# Patient Record
Sex: FEMALE | Race: WHITE
[De-identification: names, ages, dates, MRNs, and addresses within clinical notes are randomized per-mention and may not be internally consistent; named-entity substitution may affect disease eponyms.]

---

## 2018-01-26 ENCOUNTER — HOSPITAL ENCOUNTER (INPATIENT)
Dept: HOSPITAL 25 - MCHOBOUT | Age: 28
LOS: 3 days | Discharge: HOME | DRG: 560 | End: 2018-01-29
Attending: OBSTETRICS & GYNECOLOGY | Admitting: OBSTETRICS & GYNECOLOGY
Payer: COMMERCIAL

## 2018-01-26 DIAGNOSIS — Z87.891: ICD-10-CM

## 2018-01-26 DIAGNOSIS — Z3A.40: ICD-10-CM

## 2018-01-26 DIAGNOSIS — F32.9: ICD-10-CM

## 2018-01-26 LAB
BASOPHILS # BLD AUTO: 0.1 10^3/UL (ref 0–0.2)
EOSINOPHIL # BLD AUTO: 0 10^3/UL (ref 0–0.6)
HCT VFR BLD AUTO: 34 % (ref 35–47)
HGB BLD-MCNC: 11.5 G/DL (ref 12–16)
LYMPHOCYTES # BLD AUTO: 2.3 10^3/UL (ref 1–4.8)
MCH RBC QN AUTO: 29 PG (ref 27–31)
MCHC RBC AUTO-ENTMCNC: 34 G/DL (ref 31–36)
MCV RBC AUTO: 85 FL (ref 80–97)
MONOCYTES # BLD AUTO: 0.7 10^3/UL (ref 0–0.8)
NEUTROPHILS # BLD AUTO: 11.3 10^3/UL (ref 1.5–7.7)
NRBC # BLD AUTO: 0 10^3/UL
NRBC BLD QL AUTO: 0
PLATELET # BLD AUTO: 318 10^3/UL (ref 150–450)
RBC # BLD AUTO: 4.01 10^6/UL (ref 4–5.4)
WBC # BLD AUTO: 14.3 10^3/UL (ref 3.5–10.8)

## 2018-01-26 PROCEDURE — 86900 BLOOD TYPING SEROLOGIC ABO: CPT

## 2018-01-26 PROCEDURE — 84112 EVAL AMNIOTIC FLUID PROTEIN: CPT

## 2018-01-26 PROCEDURE — 86850 RBC ANTIBODY SCREEN: CPT

## 2018-01-26 PROCEDURE — 86901 BLOOD TYPING SEROLOGIC RH(D): CPT

## 2018-01-26 PROCEDURE — 85461 HEMOGLOBIN FETAL: CPT

## 2018-01-26 PROCEDURE — 85025 COMPLETE CBC W/AUTO DIFF WBC: CPT

## 2018-01-26 PROCEDURE — 36415 COLL VENOUS BLD VENIPUNCTURE: CPT

## 2018-01-27 PROCEDURE — 10907ZC DRAINAGE OF AMNIOTIC FLUID, THERAPEUTIC FROM PRODUCTS OF CONCEPTION, VIA NATURAL OR ARTIFICIAL OPENING: ICD-10-PCS | Performed by: OBSTETRICS & GYNECOLOGY

## 2018-01-27 RX ADMIN — PENICILLIN G POTASSIUM SCH MLS/HR: 20000000 POWDER, FOR SOLUTION INTRAVENOUS at 07:23

## 2018-01-27 RX ADMIN — PENICILLIN G POTASSIUM SCH MLS/HR: 20000000 POWDER, FOR SOLUTION INTRAVENOUS at 03:41

## 2018-01-27 RX ADMIN — PENICILLIN G POTASSIUM SCH MLS/HR: 20000000 POWDER, FOR SOLUTION INTRAVENOUS at 15:20

## 2018-01-27 RX ADMIN — PENICILLIN G POTASSIUM SCH MLS/HR: 20000000 POWDER, FOR SOLUTION INTRAVENOUS at 11:27

## 2018-01-27 RX ADMIN — DOCUSATE SODIUM SCH MG: 100 CAPSULE, LIQUID FILLED ORAL at 21:59

## 2018-01-27 RX ADMIN — SIMETHICONE CHEW TAB 80 MG SCH: 80 TABLET ORAL at 22:20

## 2018-01-28 LAB
BASOPHILS # BLD AUTO: 0.1 10^3/UL (ref 0–0.2)
EOSINOPHIL # BLD AUTO: 0.1 10^3/UL (ref 0–0.6)
HCT VFR BLD AUTO: 31 % (ref 35–47)
HGB BLD-MCNC: 10.7 G/DL (ref 12–16)
LYMPHOCYTES # BLD AUTO: 3 10^3/UL (ref 1–4.8)
MCH RBC QN AUTO: 29 PG (ref 27–31)
MCHC RBC AUTO-ENTMCNC: 34 G/DL (ref 31–36)
MCV RBC AUTO: 86 FL (ref 80–97)
MONOCYTES # BLD AUTO: 0.7 10^3/UL (ref 0–0.8)
NEUTROPHILS # BLD AUTO: 8 10^3/UL (ref 1.5–7.7)
NRBC # BLD AUTO: 0 10^3/UL
NRBC BLD QL AUTO: 0
PLATELET # BLD AUTO: 266 10^3/UL (ref 150–450)
RBC # BLD AUTO: 3.68 10^6/UL (ref 4–5.4)
WBC # BLD AUTO: 12 10^3/UL (ref 3.5–10.8)

## 2018-01-28 RX ADMIN — ACETAMINOPHEN PRN MG: 325 TABLET ORAL at 00:03

## 2018-01-28 RX ADMIN — IBUPROFEN PRN MG: 600 TABLET, FILM COATED ORAL at 09:34

## 2018-01-28 RX ADMIN — DOCUSATE SODIUM SCH: 100 CAPSULE, LIQUID FILLED ORAL at 15:58

## 2018-01-28 RX ADMIN — IBUPROFEN PRN MG: 600 TABLET, FILM COATED ORAL at 00:03

## 2018-01-28 RX ADMIN — DOCUSATE SODIUM SCH: 100 CAPSULE, LIQUID FILLED ORAL at 21:00

## 2018-01-28 RX ADMIN — ACETAMINOPHEN PRN MG: 325 TABLET ORAL at 19:50

## 2018-01-28 RX ADMIN — DOCUSATE SODIUM SCH MG: 100 CAPSULE, LIQUID FILLED ORAL at 09:34

## 2018-01-28 RX ADMIN — IBUPROFEN PRN MG: 600 TABLET, FILM COATED ORAL at 22:27

## 2018-01-28 RX ADMIN — IBUPROFEN PRN MG: 600 TABLET, FILM COATED ORAL at 16:40

## 2018-01-29 VITALS — SYSTOLIC BLOOD PRESSURE: 107 MMHG | DIASTOLIC BLOOD PRESSURE: 61 MMHG

## 2018-01-29 RX ADMIN — IBUPROFEN PRN MG: 600 TABLET, FILM COATED ORAL at 04:20

## 2018-09-28 ENCOUNTER — HOSPITAL ENCOUNTER (OUTPATIENT)
Dept: HOSPITAL 25 - ED | Age: 28
Setting detail: OBSERVATION
LOS: 3 days | Discharge: HOME | End: 2018-10-01
Attending: SURGERY | Admitting: SURGERY
Payer: COMMERCIAL

## 2018-09-28 DIAGNOSIS — E80.6: ICD-10-CM

## 2018-09-28 DIAGNOSIS — R11.2: ICD-10-CM

## 2018-09-28 DIAGNOSIS — Z79.899: ICD-10-CM

## 2018-09-28 DIAGNOSIS — F17.210: ICD-10-CM

## 2018-09-28 DIAGNOSIS — Z23: ICD-10-CM

## 2018-09-28 DIAGNOSIS — Z88.8: ICD-10-CM

## 2018-09-28 DIAGNOSIS — K80.10: Primary | ICD-10-CM

## 2018-09-28 DIAGNOSIS — K81.9: ICD-10-CM

## 2018-09-28 DIAGNOSIS — R74.8: ICD-10-CM

## 2018-09-28 LAB
BASOPHILS # BLD AUTO: 0.1 10^3/UL (ref 0–0.2)
EOSINOPHIL # BLD AUTO: 0.1 10^3/UL (ref 0–0.6)
HCT VFR BLD AUTO: 41 % (ref 35–47)
HGB BLD-MCNC: 13.7 G/DL (ref 12–16)
LYMPHOCYTES # BLD AUTO: 2.1 10^3/UL (ref 1–4.8)
MCH RBC QN AUTO: 28 PG (ref 27–31)
MCHC RBC AUTO-ENTMCNC: 34 G/DL (ref 31–36)
MCV RBC AUTO: 83 FL (ref 80–97)
MONOCYTES # BLD AUTO: 0.6 10^3/UL (ref 0–0.8)
NEUTROPHILS # BLD AUTO: 4.8 10^3/UL (ref 1.5–7.7)
NRBC # BLD AUTO: 0 10^3/UL
NRBC BLD QL AUTO: 0.1
PLATELET # BLD AUTO: 335 10^3/UL (ref 150–450)
RBC # BLD AUTO: 4.9 10^6/UL (ref 4–5.4)
RBC UR QL AUTO: (no result)
WBC # BLD AUTO: 7.7 10^3/UL (ref 3.5–10.8)
WBC UR QL AUTO: (no result)

## 2018-09-28 PROCEDURE — 87086 URINE CULTURE/COLONY COUNT: CPT

## 2018-09-28 PROCEDURE — 99406 BEHAV CHNG SMOKING 3-10 MIN: CPT

## 2018-09-28 PROCEDURE — 99283 EMERGENCY DEPT VISIT LOW MDM: CPT

## 2018-09-28 PROCEDURE — G0378 HOSPITAL OBSERVATION PER HR: HCPCS

## 2018-09-28 PROCEDURE — 96376 TX/PRO/DX INJ SAME DRUG ADON: CPT

## 2018-09-28 PROCEDURE — 84702 CHORIONIC GONADOTROPIN TEST: CPT

## 2018-09-28 PROCEDURE — G0008 ADMIN INFLUENZA VIRUS VAC: HCPCS

## 2018-09-28 PROCEDURE — 85025 COMPLETE CBC W/AUTO DIFF WBC: CPT

## 2018-09-28 PROCEDURE — 86140 C-REACTIVE PROTEIN: CPT

## 2018-09-28 PROCEDURE — 83690 ASSAY OF LIPASE: CPT

## 2018-09-28 PROCEDURE — 80053 COMPREHEN METABOLIC PANEL: CPT

## 2018-09-28 PROCEDURE — 90686 IIV4 VACC NO PRSV 0.5 ML IM: CPT

## 2018-09-28 PROCEDURE — 90471 IMMUNIZATION ADMIN: CPT

## 2018-09-28 PROCEDURE — 36415 COLL VENOUS BLD VENIPUNCTURE: CPT

## 2018-09-28 PROCEDURE — 80076 HEPATIC FUNCTION PANEL: CPT

## 2018-09-28 PROCEDURE — 76705 ECHO EXAM OF ABDOMEN: CPT

## 2018-09-28 PROCEDURE — 96374 THER/PROPH/DIAG INJ IV PUSH: CPT

## 2018-09-28 PROCEDURE — 96375 TX/PRO/DX INJ NEW DRUG ADDON: CPT

## 2018-09-28 PROCEDURE — 88304 TISSUE EXAM BY PATHOLOGIST: CPT

## 2018-09-28 PROCEDURE — 81003 URINALYSIS AUTO W/O SCOPE: CPT

## 2018-09-28 PROCEDURE — 83605 ASSAY OF LACTIC ACID: CPT

## 2018-09-28 PROCEDURE — 81015 MICROSCOPIC EXAM OF URINE: CPT

## 2018-09-28 NOTE — ED
Abdominal Pain/Female





- HPI Summary


HPI Summary: 


Patient is a 29 y/o F w/ c/o RUQ pain onsetting last night at 2200. She reports 

nausea and vomiting onsetting last night as well. In room, nausea is reported 

to still be present. Pain was noted to be intermittent last night but has been 

constant today. Pain radiates to her back. On triage, pain is rated 3/10 and 

nothing is noted to aggravate/alleviate Sx. In room patient notes eating 

aggravates Sx. Home medications and allergies are reviewed. 








- History of Current Complaint


Chief Complaint: EDAbdPain


Stated Complaint: RT SIDE FLANK PAIN,NAUSEA


Time Seen by Provider: 09/28/18 22:04


Hx Obtained From: Patient


Hx Last Menstrual Period: finished last week


Onset/Duration: Lasting Days - onset last night 2200, Still Present


Timing: Constant - today pain has been constant


Severity Currently: Mild - 3/10


Pain Intensity: 3


Pain Scale Used: 0-10 Numeric - 3/10


Location: Discrete At: RUQ


Radiates: Yes


Radiates to: Back


Aggravating Factor(s): Food


Alleviating Factor(s): Nothing


Associated Signs and Symptoms: Positive: Back Pain, Nausea, Vomiting


Allergies/Adverse Reactions: 


 Allergies











Allergy/AdvReac Type Severity Reaction Status Date / Time


 


shellfish derived Allergy  Anaphylatic Verified 09/28/18 19:15





   Shock  


 


Sulfa (Sulfonamide Allergy  Vomiting Verified 09/28/18 19:15





Antibiotics)     


 


trazodone Allergy  Shortness Verified 09/28/18 19:15





   of Breath  














PMH/Surg Hx/FS Hx/Imm Hx


Sensory History: 


   Denies: Hx Legally Blind, Hx Deafness


Opthamlomology History: 


   Denies: Hx Legally Blind


EENT History: 


   Denies: Hx Deafness


Psychiatric History: Reports: Hx Depression - taking zoloft


Infectious Disease History: No


Infectious Disease History: 


   Denies: Traveled Outside the [place] in Last 30 Days





- Family History


Known Family History: 


   Negative: Blood Disorder





- Social History


Alcohol Use: None


Substance Use Type: Reports: None


Smoking Status (MU): Never Smoked Tobacco





Review of Systems


Negative: Fever - on vitals, temp is 97.4 F 


Positive: Abdominal Pain - RUQ , Vomiting, Nausea


Positive: Other - RUQ pain radiates to back 


All Other Systems Reviewed And Are Negative: Yes





Physical Exam





- Summary


Physical Exam Summary: 


 


VITAL SIGNS: Reviewed.


GENERAL:  Patient is a well-developed and nourished female who is lying 

comfortable in the stretcher. Patient is not in any acute respiratory distress.


HEAD AND FACE: No signs of trauma. No ecchymosis, hematomas or skull 

depressions. No sinus tenderness.


EYES: PERRLA, EOMI x 2, No injected conjunctiva, no nystagmus.


EARS: Hearing grossly intact. Ear canals and tympanic membranes are within 

normal limits.


MOUTH: Oropharynx within normal limits.


NECK: Supple, trachea is midline, no adenopathy, no JVD, no carotid bruit, no c-

spine tenderness, neck with full ROM.


CHEST: Symmetric, no tenderness at palpation


LUNGS: Clear to auscultation bilaterally. No wheezing or crackles.


CVS: Regular rate and rhythm, S1 and S2 present, no murmurs or gallops 

appreciated.


ABDOMEN: Soft, RUQ tenderness. No signs of distention. No rebound no guarding, 

and no masses palpated. Bowel sounds are normal.


EXTREMITIES: FROM in all major joints, no edema, no cyanosis or clubbing.


NEURO: Alert and oriented x 3. No acute neurological deficits. Speech is normal 

and follows commands.


SKIN: Dry and warm








Triage Information Reviewed: Yes


Vital Signs On Initial Exam: 


 Initial Vitals











Temp Pulse Resp BP Pulse Ox


 


 97.4 F   87   16   129/65   97 


 


 09/28/18 19:12  09/28/18 19:12  09/28/18 19:12  09/28/18 19:12  09/28/18 19:12











Vital Signs Reviewed: Yes





Diagnostics





- Vital Signs


 Vital Signs











  Temp Pulse Resp BP Pulse Ox


 


 09/28/18 21:14  97.4 F  63  16  112/42  100


 


 09/28/18 19:12  97.4 F  87  16  129/65  97














- Laboratory


Lab Results: 


 Lab Results











  09/28/18 09/28/18 09/28/18 Range/Units





  20:38 20:38 20:38 


 


WBC  7.7    (3.5-10.8)  10^3/ul


 


RBC  4.90    (4.00-5.40)  10^6/ul


 


Hgb  13.7    (12.0-16.0)  g/dl


 


Hct  41    (35-47)  %


 


MCV  83    (80-97)  fL


 


MCH  28    (27-31)  pg


 


MCHC  34    (31-36)  g/dl


 


RDW  14    (10.5-15)  %


 


Plt Count  335    (150-450)  10^3/ul


 


MPV  7.8    (7.4-10.4)  um3


 


Neut % (Auto)  62.4    (38-83)  %


 


Lymph % (Auto)  27.2    (25-47)  %


 


Mono % (Auto)  8.0 H    (0-7)  %


 


Eos % (Auto)  1.6    (0-6)  %


 


Baso % (Auto)  0.8    (0-2)  %


 


Absolute Neuts (auto)  4.8    (1.5-7.7)  10^3/ul


 


Absolute Lymphs (auto)  2.1    (1.0-4.8)  10^3/ul


 


Absolute Monos (auto)  0.6    (0-0.8)  10^3/ul


 


Absolute Eos (auto)  0.1    (0-0.6)  10^3/ul


 


Absolute Basos (auto)  0.1    (0-0.2)  10^3/ul


 


Absolute Nucleated RBC  0    10^3/ul


 


Nucleated RBC %  0.1    


 


Sodium   139   (135-145)  mmol/L


 


Potassium   4.2   (3.5-5.0)  mmol/L


 


Chloride   105   (101-111)  mmol/L


 


Carbon Dioxide   27   (22-32)  mmol/L


 


Anion Gap   7   (2-11)  mmol/L


 


BUN   12   (6-24)  mg/dL


 


Creatinine   0.71   (0.51-0.95)  mg/dL


 


Est GFR ( Amer)   117.0   (>60)  


 


Est GFR (Non-Af Amer)   96.7   (>60)  


 


BUN/Creatinine Ratio   16.9   (8-20)  


 


Glucose   100   ()  mg/dL


 


Lactic Acid    0.8  (0.5-2.0)  mmol/L


 


Calcium   9.1   (8.6-10.3)  mg/dL


 


Total Bilirubin   1.20 H   (0.2-1.0)  mg/dL


 


AST   457 H   (13-39)  U/L


 


ALT   475 H   (7-52)  U/L


 


Alkaline Phosphatase   172 H   ()  U/L


 


C-Reactive Protein   11.34 H   (<8.01)  mg/L


 


Total Protein   7.0   (6.4-8.9)  g/dL


 


Albumin   4.2   (3.2-5.2)  g/dL


 


Globulin   2.8   (2-4)  g/dL


 


Albumin/Globulin Ratio   1.5   (1-3)  


 


Lipase   29   (11.0-82.0)  U/L


 


Beta HCG, Quant   < 0.60   mIU/mL











Result Diagrams: 


 09/28/18 20:38





 09/28/18 20:38


Lab Statement: Any lab studies that have been ordered have been reviewed, and 

results considered in the medical decision making process.





- Ultrasound


  ** No standard instances


Ultrasound Interpretation: Positive (See Comments)


Ultrasound Interpretation Completed By: Radiologist - US gallbladder impressions

: cholelithiasis; this report was reviewed by ED physician





Re-Evaluation





- Re-Evaluation


  ** First Eval


Re-Evaluation Time: 00:55


Comment: Discuss[place] [facility] [place] Bae see her in 

the morning. Patient understands and is agreeable with this plan.





Abdominal Pain Fem Course/Dx





- Course


Course Of Treatment: Patient is a 29 y/o F w/ c/o RUQ pain onsetting last night 

at 2200. She reports nausea and vomiting onsetting last night as well. In room, 

nausea is reported to still be present. Pain was noted to be intermittent last 

night but has been constant today. Pain radiates to her back. On triage, pain 

is rated 3/10 and nothing is noted to aggravate/alleviate Sx. In room patient 

notes eating aggravates Sx. Physical exam showed RUQ tenderness. During ED 

course, patient was given fluids. UA showed 2+ urine blood, positive 

urobilinogen, urine leukocyte esterase, urine WBC 2+(11-20/hpf), urine RBC 2+(6-

10/hpf), and urine squamous epith cells present. Urine color was kay and 

cloudy in appearance. Bloodwork showed CRP 11.34, alkaline phosphatase 172, AST 

457, , and total bilirubin 1.2. Lactic acid was .8. Gallbladder US 

showed cholelithiasis. Dr. Bae was consulted on patient's case at 0049. Dr. Bae accepts patient for [place] [facility] [place] patient in the 

morning. Plan of care was discussed with patient, she understands and is 

agreeable with this plan. Dx of elevated liver enzymes and cholelithiasis.





- Diagnoses


Provider Diagnoses: 


 Cholelithiasis, Elevated liver enzymes








- Provider Notifications


Discussed Care Of Patient With: Gloria Bae


Time Discussed With Above Provider: 00:49


Instructed by Provider To: Other - Patient's case was discussed with Dr. Bae 

at 0049. Dr. Bae accepts patient for [place] [facility] [place] see the 

patient in the morning.





Discharge





- Sign-Out/Discharge


Documenting (check all that apply): Patient Departure - admit 





- Discharge Plan


Condition: Good


Disposition: ADMITTED TO [facility]


Referrals: 


Bairon Au MD [Primary Care Provider] - 





- Attestation Statements


Document Initiated by Scribe: Yes


Documenting Scribe: Tej Palmer


Provider For Whom Scribe is Documenting (Include Credential): Armando Costa MD


Scribe Attestation: 


ITej, scribed for Armando Costa MD on 09/29/18 at 0135.

## 2018-09-29 LAB
BASOPHILS # BLD AUTO: 0 10^3/UL (ref 0–0.2)
EOSINOPHIL # BLD AUTO: 0.2 10^3/UL (ref 0–0.6)
HCT VFR BLD AUTO: 39 % (ref 35–47)
HGB BLD-MCNC: 13.4 G/DL (ref 12–16)
LYMPHOCYTES # BLD AUTO: 1.6 10^3/UL (ref 1–4.8)
MCH RBC QN AUTO: 28 PG (ref 27–31)
MCHC RBC AUTO-ENTMCNC: 34 G/DL (ref 31–36)
MCV RBC AUTO: 83 FL (ref 80–97)
MONOCYTES # BLD AUTO: 0.4 10^3/UL (ref 0–0.8)
NEUTROPHILS # BLD AUTO: 4.7 10^3/UL (ref 1.5–7.7)
NRBC # BLD AUTO: 0 10^3/UL
NRBC BLD QL AUTO: 0
PLATELET # BLD AUTO: 289 10^3/UL (ref 150–450)
RBC # BLD AUTO: 4.76 10^6/UL (ref 4–5.4)
WBC # BLD AUTO: 7 10^3/UL (ref 3.5–10.8)

## 2018-09-29 PROCEDURE — 0FT44ZZ RESECTION OF GALLBLADDER, PERCUTANEOUS ENDOSCOPIC APPROACH: ICD-10-PCS | Performed by: SURGERY

## 2018-09-29 PROCEDURE — BF10YZZ FLUOROSCOPY OF BILE DUCTS USING OTHER CONTRAST: ICD-10-PCS | Performed by: SURGERY

## 2018-09-29 RX ADMIN — DEXTROSE MONOHYDRATE, SODIUM CHLORIDE, AND POTASSIUM CHLORIDE SCH MLS/HR: 50; 4.5; 1.49 INJECTION, SOLUTION INTRAVENOUS at 21:06

## 2018-09-29 RX ADMIN — DEXTROSE MONOHYDRATE, SODIUM CHLORIDE, AND POTASSIUM CHLORIDE SCH MLS/HR: 50; 4.5; 1.49 INJECTION, SOLUTION INTRAVENOUS at 03:27

## 2018-09-29 RX ADMIN — PIPERACILLIN AND TAZOBACTAM SCH MLS/HR: 3; .375 INJECTION, POWDER, LYOPHILIZED, FOR SOLUTION INTRAVENOUS; PARENTERAL at 16:44

## 2018-09-29 RX ADMIN — ACETAMINOPHEN PRN MG: 325 TABLET ORAL at 22:38

## 2018-09-29 RX ADMIN — MORPHINE SULFATE PRN MG: 4 INJECTION, SOLUTION INTRAMUSCULAR; INTRAVENOUS at 19:46

## 2018-09-29 RX ADMIN — DEXTROSE MONOHYDRATE, SODIUM CHLORIDE, AND POTASSIUM CHLORIDE SCH MLS/HR: 50; 4.5; 1.49 INJECTION, SOLUTION INTRAVENOUS at 12:32

## 2018-09-29 RX ADMIN — PIPERACILLIN AND TAZOBACTAM SCH MLS/HR: 3; .375 INJECTION, POWDER, LYOPHILIZED, FOR SOLUTION INTRAVENOUS; PARENTERAL at 08:25

## 2018-09-29 NOTE — RAD
EXAM:

  US Abdomen Limited, Right Upper Quadrant



CLINICAL HISTORY:

  28 years old, female; Pain; Abdominal pain; Tenderness; Right upper quadrant 

(ruq)



TECHNIQUE:

  Real-time ultrasound of the right upper quadrant with image documentation.



COMPARISON:

  No relevant prior studies available.



FINDINGS:

  Liver:  Unremarkable.  No mass.  No intrahepatic bile duct dilation.

  Gallbladder:  Multiple gallstones including a stone impacted in the 

gallbladder neck.  Gallbladder wall measures 2.3 mm.  Technologist reports 

patient pain when imaged over the gallbladder.

  Common bile duct:  Common bile duct measures 5.2 mm.  No stones.  No dilation.

  Pancreas:  Pancreas poorly visualized due to overlying bowel gas.

  Right kidney:  Right kidney measures 11.3 x 6.3 x 5.2 cm.  No stones.  No 

hydronephrosis.



IMPRESSION:     

  1. Cholelithiasis.

## 2018-09-29 NOTE — PN
Progress Note





- Progress Note


Date of Service: 09/29/18


Note: 





Surgery H & P





Asked by Dr. Yeung to admit a pt. with cholelithiasis.  Ms. Melton is a 28 

y.o. female who reports that this episode began suddenly 2 nights ago.  She 

woke up out of sleep with intense stabbing epigastric and RUQ pain.  She 

vomited but didn't feel better.  She "couldn't get comfortable."  She had had 

tater tots and chicken tenders for dinner preceding the episode.  The pain 

subsided somewhat, but recurred last night such that she had to come to the ER.

  She recalls that ever since the pregnancy with her older child 4 years ago, 

she has had 2-3 episodes per year of similar but less intense pain.  She denies 

change in the color of her urine or stool with any of the episodes.  In the ER 

she was found by [place] to have multiple gallstones including one that appears to 

be impacted in the duct.





PMHx:  denies


ROS:  neg.


Meds:  mirena IUD


ALL:  sulfa, trazodone


SH:  1/2 ppd cigs x ~10 years; 


FH:  mother has h/o DM, CVA; twin sister and a cousin have h/o cholecystectomy





PE: General:  overweight female in NAD


 Vital Signs - 24 hr











  09/28/18 09/28/18 09/28/18





  19:12 21:14 22:11


 


Temperature 97.4 F 97.4 F 


 


Pulse Rate 87 63 68


 


Respiratory 16 16 





Rate   


 


Blood Pressure 129/65 112/42 115/80





(mmHg)   


 


O2 Sat by Pulse 97 100 100





Oximetry   














  09/28/18 09/28/18 09/28/18





  22:12 22:40 23:00


 


Temperature   


 


Pulse Rate 72 72 67


 


Respiratory   





Rate   


 


Blood Pressure  121/72 





(mmHg)   


 


O2 Sat by Pulse 100 99 98





Oximetry   














  09/28/18 09/29/18 09/29/18





  23:10 00:06 00:07


 


Temperature   


 


Pulse Rate 70 64 66


 


Respiratory   





Rate   


 


Blood Pressure 126/73  106/62





(mmHg)   


 


O2 Sat by Pulse 100 100 99





Oximetry   














  09/29/18 09/29/18 09/29/18





  00:11 01:22 03:12


 


Temperature  98.1 F 98.0 F


 


Pulse Rate 64 78 67


 


Respiratory  16 16





Rate   


 


Blood Pressure 103/58 129/89 113/53





(mmHg)   


 


O2 Sat by Pulse 98 99 100





Oximetry   














  09/29/18 09/29/18 09/29/18





  03:45 04:25 07:17


 


Temperature   97.7 F


 


Pulse Rate  62 72


 


Respiratory 16  18





Rate   


 


Blood Pressure   118/59





(mmHg)   


 


O2 Sat by Pulse   99





Oximetry   














  09/29/18





  07:23


 


Temperature 


 


Pulse Rate 


 


Respiratory 18





Rate 


 


Blood Pressure 





(mmHg) 


 


O2 Sat by Pulse 





Oximetry 











   HEENT:  anicteric sclerae, moist oral mucosa, neg. cervical adenopathy


   lungs:  clear to ausc.


   heart:  reg.


   abd:  obese, good BS, soft, non-tender, no guarding or rebound


   ext:  neg. cyanosis, edema


   


 Laboratory Results - last 24 hr











  09/28/18 09/28/18 09/28/18





  20:38 20:38 20:38


 


WBC  7.7  


 


RBC  4.90  


 


Hgb  13.7  


 


Hct  41  


 


MCV  83  


 


MCH  28  


 


MCHC  34  


 


RDW  14  


 


Plt Count  335  


 


MPV  7.8  


 


Neut % (Auto)  62.4  


 


Lymph % (Auto)  27.2  


 


Mono % (Auto)  8.0 H  


 


Eos % (Auto)  1.6  


 


Baso % (Auto)  0.8  


 


Absolute Neuts (auto)  4.8  


 


Absolute Lymphs (auto)  2.1  


 


Absolute Monos (auto)  0.6  


 


Absolute Eos (auto)  0.1  


 


Absolute Basos (auto)  0.1  


 


Absolute Nucleated RBC  0  


 


Nucleated RBC %  0.1  


 


Sodium   139 


 


Potassium   4.2 


 


Chloride   105 


 


Carbon Dioxide   27 


 


Anion Gap   7 


 


BUN   12 


 


Creatinine   0.71 


 


Est GFR ( Amer)   117.0 


 


Est GFR (Non-Af Amer)   96.7 


 


BUN/Creatinine Ratio   16.9 


 


Glucose   100 


 


Lactic Acid    0.8


 


Calcium   9.1 


 


Total Bilirubin   1.20 H 


 


AST   457 H 


 


ALT   475 H 


 


Alkaline Phosphatase   172 H 


 


C-Reactive Protein   11.34 H 


 


Total Protein   7.0 


 


Albumin   4.2 


 


Globulin   2.8 


 


Albumin/Globulin Ratio   1.5 


 


Lipase   29 


 


Beta HCG, Quant   < 0.60 


 


Urine Color   


 


Urine Appearance   


 


Urine pH   


 


Ur Specific Gravity   


 


Urine Protein   


 


Urine Ketones   


 


Urine Blood   


 


Urine Nitrate   


 


Urine Bilirubin   


 


Urine Urobilinogen   


 


Ur Leukocyte Esterase   


 


Urine WBC (Auto)   


 


Urine RBC (Auto)   


 


Ur Squamous Epith Cells   


 


Urine Bacteria   


 


Urine Glucose   














  09/28/18





  22:10


 


WBC 


 


RBC 


 


Hgb 


 


Hct 


 


MCV 


 


MCH 


 


MCHC 


 


RDW 


 


Plt Count 


 


MPV 


 


Neut % (Auto) 


 


Lymph % (Auto) 


 


Mono % (Auto) 


 


Eos % (Auto) 


 


Baso % (Auto) 


 


Absolute Neuts (auto) 


 


Absolute Lymphs (auto) 


 


Absolute Monos (auto) 


 


Absolute Eos (auto) 


 


Absolute Basos (auto) 


 


Absolute Nucleated RBC 


 


Nucleated RBC % 


 


Sodium 


 


Potassium 


 


Chloride 


 


Carbon Dioxide 


 


Anion Gap 


 


BUN 


 


Creatinine 


 


Est GFR ( Amer) 


 


Est GFR (Non-Af Amer) 


 


BUN/Creatinine Ratio 


 


Glucose 


 


Lactic Acid 


 


Calcium 


 


Total Bilirubin 


 


AST 


 


ALT 


 


Alkaline Phosphatase 


 


C-Reactive Protein 


 


Total Protein 


 


Albumin 


 


Globulin 


 


Albumin/Globulin Ratio 


 


Lipase 


 


Beta HCG, Quant 


 


Urine Color  Becky


 


Urine Appearance  Cloudy


 


Urine pH  6.0


 


Ur Specific Gravity  1.025


 


Urine Protein  Negative


 


Urine Ketones  Negative


 


Urine Blood  2+ A


 


Urine Nitrate  Negative


 


Urine Bilirubin  Negative


 


Urine Urobilinogen  Positive A


 


Ur Leukocyte Esterase  1+ A


 


Urine WBC (Auto)  2+(11-20/hpf) A


 


Urine RBC (Auto)  2+(6-10/hpf) A


 


Ur Squamous Epith Cells  Present A


 


Urine Bacteria  Absent


 


Urine Glucose  Negative











A/P:  Cholelithiasis, with mild hyperbilirubinemia and elevations of liver 

enzymes.  Will repeat labs, consider additional imaging pnd labs.





CLFoster

## 2018-09-30 RX ADMIN — DEXTROSE MONOHYDRATE, SODIUM CHLORIDE, AND POTASSIUM CHLORIDE SCH MLS/HR: 50; 4.5; 1.49 INJECTION, SOLUTION INTRAVENOUS at 23:26

## 2018-09-30 RX ADMIN — ACETAMINOPHEN PRN MG: 325 TABLET ORAL at 19:52

## 2018-09-30 RX ADMIN — PIPERACILLIN AND TAZOBACTAM SCH MLS/HR: 3; .375 INJECTION, POWDER, LYOPHILIZED, FOR SOLUTION INTRAVENOUS; PARENTERAL at 00:43

## 2018-09-30 RX ADMIN — DEXTROSE MONOHYDRATE, SODIUM CHLORIDE, AND POTASSIUM CHLORIDE SCH MLS/HR: 50; 4.5; 1.49 INJECTION, SOLUTION INTRAVENOUS at 13:18

## 2018-09-30 RX ADMIN — MORPHINE SULFATE PRN MG: 4 INJECTION, SOLUTION INTRAMUSCULAR; INTRAVENOUS at 14:43

## 2018-09-30 RX ADMIN — PIPERACILLIN AND TAZOBACTAM SCH MLS/HR: 3; .375 INJECTION, POWDER, LYOPHILIZED, FOR SOLUTION INTRAVENOUS; PARENTERAL at 08:05

## 2018-09-30 RX ADMIN — PIPERACILLIN AND TAZOBACTAM SCH MLS/HR: 3; .375 INJECTION, POWDER, LYOPHILIZED, FOR SOLUTION INTRAVENOUS; PARENTERAL at 16:29

## 2018-09-30 RX ADMIN — DEXTROSE MONOHYDRATE, SODIUM CHLORIDE, AND POTASSIUM CHLORIDE SCH MLS/HR: 50; 4.5; 1.49 INJECTION, SOLUTION INTRAVENOUS at 05:17

## 2018-09-30 NOTE — PN
Progress Note





- Progress Note


Date of Service: 09/30/18


SOAP: 


Subjective:


Pain last night.  None today.  No N/V.





Objective:





 Vital Signs











Temp  97.4 F   09/30/18 11:28


 


Pulse  51   09/30/18 11:28


 


Resp  16   09/30/18 11:28


 


BP  94/60   09/30/18 11:28


 


Pulse Ox  100   09/30/18 11:28





Gen: NAD; anicteric


Abd: soft; ND; tender RUQ; no masses


 Intake & Output











 09/29/18 09/30/18 09/30/18





 18:59 06:59 18:59


 


Intake Total 1339 3027 700


 


Output Total 1150 1300 250


 


Balance 189 1727 450


 


Intake:   


 


  IV Fluids 907 1986 


 


    D5 1/2 NS 20KCL 907 1986 


 


  Medicated  221 


 


    Zosyn 192 221 


 


  Oral 240 820 700


 


Output:   


 


  Urine 1150 1300 250


 


Other:   


 


  Estimated Void Medium  


 


  # Bowel Movements  0 


 


  # Voids 2  








 Laboratory Results - last 24 hr











  09/30/18





  05:26


 


Total Bilirubin  0.50


 


Direct Bilirubin  0.10


 


Indirect Bilirubin  0.4


 


AST  65 H


 


ALT  252 H


 


Alkaline Phosphatase  141 H


 


Total Protein  5.7 L


 


Albumin  3.4


 


Globulin  2.3


 


Albumin/Globulin Ratio  1.5














Assessment:


Sx gallstones.  She may have passed a CBD stone as LFTs are normalizing.








Plan:


lap levon in am.

## 2018-10-01 VITALS — SYSTOLIC BLOOD PRESSURE: 129 MMHG | DIASTOLIC BLOOD PRESSURE: 66 MMHG

## 2018-10-01 RX ADMIN — PIPERACILLIN AND TAZOBACTAM SCH: 3; .375 INJECTION, POWDER, LYOPHILIZED, FOR SOLUTION INTRAVENOUS; PARENTERAL at 10:38

## 2018-10-01 RX ADMIN — PIPERACILLIN AND TAZOBACTAM SCH MLS/HR: 3; .375 INJECTION, POWDER, LYOPHILIZED, FOR SOLUTION INTRAVENOUS; PARENTERAL at 00:36

## 2018-10-02 NOTE — OP
CC:  Dr. Au *

 

DATE OF OPERATION:  10/01/18 - ROOM #3540

 

DATE OF BIRTH:  [birth year]

 

SURGEON:  Juan Luis Rodas MD

 

ASSISTANT:  Caroline Nguyen NP

 

ANESTHESIOLOGIST:  Dr. Lance.

 

ANESTHESIA:  General with local.

 

PRE-OP DIAGNOSES:  Right upper quadrant abdominal pain and gallstones.

 

POST-OP DIAGNOSES:

1.  Acute calculous cholecystitis.

2.  Normal cholangiogram.

 

OPERATIVE PROCEDURE:  Laparoscopic cholecystectomy with intraoperative 
cholangiogram.

 

INDICATIONS:  Ms. Sadie Melton is a 28-year-old woman who presented to the 
emergency room several nights ago with epigastric right upper quadrant 
abdominal discomfort.  She has had similar episodes in the past, which resolved 
quickly but this persisted throughout the course of the day.  She was noted to 
have some mild elevation in total bilirubin and liver transaminases and an 
ultrasound, which showed gallstones with probable gallstone at the neck of the 
gallbladder.  There was no ductal dilation.  She had normal white blood cell 
count and no fever.

 

She was admitted and started on IV Zosyn and over the course of the last 24 
hours, her liver transaminases and bilirubin have trended down towards normal.  
No further diagnostic studies have been performed and she is still having right 
upper quadrant abdominal pain.

 

She is now being taken to the operating room for laparoscopic cholecystectomy 
with possible cholangiogram.  Procedure was discussed with her and the risks of
, but not limited to, bleeding, infection, intraabdominal abscess formation, 
injury to peritoneal and retroperitoneal structures, common bile duct injury 
requiring further surgical intervention and reconstruction, possibility of an 
open procedure, and the risks of general anesthesia and deep vein thrombosis 
were all explained.

 

ESTIMATED BLOOD LOSS:  Minimal.

 

IV FLUIDS:  1 L of crystalloid.

 

SPECIMEN:  Gallbladder containing gallstones.

 

DRAINS:  None.

 

WOUND CLASSIFICATION:  II.

 

FINDINGS:  The patient had small gallstones as well as some edema within the 
gallbladder wall consistent with acute calculous cholecystitis.  Due to 
preoperative liver function test abnormalities, which had been trending down, a 
cholangiogram was performed, which showed no evidence of filling defect or 
other acute ductal abnormality.

 

DESCRIPTION OF PROCEDURE:  Written informed consent was obtained, the abdomen 
was marked with indelible ink and preoperative antibiotics were administered.  
She was taken to the operating room and placed in the supine position.  
Sequential compression devices and a warming blanket were applied.  General 
anesthesia was administered.  The abdomen was prepped and draped in the usual 
sterile fashion.

 

Initially, a small transverse incision was made after injecting local in the 
left upper quadrant of the abdominal wall.  I attempted to use the Optiview 5-
mm port using a 5-mm 0-degree camera to pass through the abdominal wall, 
although I had some difficulty and I did not feel comfortable proceeding to 
pass through the abdominal wall.  Thus, I converted it to an open technique.

 

Small transverse incision was made just above the umbilicus.  The peritoneal 
cavity was entered under direct vision in the usual fashion.

 

A 12-mm blunt port was inserted and the abdomen was insufflated to 15 mmHg.

 

I carefully evaluated the site on the left upper quadrant where I had attempted 
to pass the Optiview catheter.  There was no evidence of passage through the 
anterior abdominal wall.  There was no intraabdominal injury noted.

 

Next, an 11-mm epigastric port was placed and two 5-mm ports were placed in the 
right side of the abdominal wall.

 

Upon evaluating the liver, it appeared to be normal except for having fatty 
infiltration.  The gallbladder wall was slightly edematous but mainly bluish 
and greenish in color, but as discussed further in the infundibular area, there 
was edema surrounding the gallbladder, all findings consistent with acute 
calculous cholecystitis.

 

The gallbladder was grasped and elevated up over the liver bed.

 

With care, the peritoneum along the medial and lateral aspect of the 
gallbladder was taken down to expose the cystic duct and cystic artery as I 
entered the gallbladder.  I took a considerable portion of the inferior part of 
the gallbladder off the liver bed using the critical view technique to assure 
myself of these structures.

 

The cystic duct appeared to be somewhat slightly larger than expected and in 
light of the patient's preoperative liver transaminases and the possibility 
that may have passed the stone, I did proceed with a cholangiogram through the 
cystic duct.  This showed free flow of contrast into the duodenum without 
ductal dilatation or filling defect in the distal common bile duct.  This had 
not been yet read officially by Radiology at this point.

 

The cystic duct and artery were then doubly clipped and divided.  The 
gallbladder was removed from the liver bed using cautery and brought out 
through the umbilical incision without difficulty.

 

Liver bed was irrigated and hemostasis was assured.

 

All ports were removed under direct vision of the camera.  The umbilical fascia 
was closed with an interrupted 0 Vicryl suture.  The skin was approximated with 
subcuticular 4-0 Vicryl suture.  Steri-Strips were applied.  The patient 
tolerated the procedure well and returned to the recovery room in stable 
condition.

 

 [id number]/[facility] #: [id number]

MADHAV